# Patient Record
Sex: FEMALE | Race: WHITE | Employment: OTHER | URBAN - METROPOLITAN AREA
[De-identification: names, ages, dates, MRNs, and addresses within clinical notes are randomized per-mention and may not be internally consistent; named-entity substitution may affect disease eponyms.]

---

## 2019-10-02 ENCOUNTER — MEDICAL CORRESPONDENCE (OUTPATIENT)
Dept: HEALTH INFORMATION MANAGEMENT | Facility: CLINIC | Age: 64
End: 2019-10-02

## 2019-10-02 ENCOUNTER — TRANSFERRED RECORDS (OUTPATIENT)
Dept: HEALTH INFORMATION MANAGEMENT | Facility: CLINIC | Age: 64
End: 2019-10-02

## 2019-10-21 ENCOUNTER — HOSPITAL ENCOUNTER (OUTPATIENT)
Dept: CT IMAGING | Facility: CLINIC | Age: 64
Discharge: HOME | End: 2019-10-21
Attending: OPHTHALMOLOGY | Admitting: OPHTHALMOLOGY
Payer: COMMERCIAL

## 2019-10-21 ENCOUNTER — OFFICE VISIT (OUTPATIENT)
Dept: OPHTHALMOLOGY | Facility: CLINIC | Age: 64
End: 2019-10-21
Attending: OPHTHALMOLOGY
Payer: COMMERCIAL

## 2019-10-21 DIAGNOSIS — H57.89 THYROID EYE DISEASE: ICD-10-CM

## 2019-10-21 DIAGNOSIS — H50.51 ESOPHORIA: ICD-10-CM

## 2019-10-21 DIAGNOSIS — H57.89 THYROID EYE DISEASE: Primary | ICD-10-CM

## 2019-10-21 DIAGNOSIS — E07.9 THYROID EYE DISEASE: ICD-10-CM

## 2019-10-21 DIAGNOSIS — E07.9 THYROID EYE DISEASE: Primary | ICD-10-CM

## 2019-10-21 PROCEDURE — 84445 ASSAY OF TSI GLOBULIN: CPT | Performed by: OPHTHALMOLOGY

## 2019-10-21 PROCEDURE — 70480 CT ORBIT/EAR/FOSSA W/O DYE: CPT

## 2019-10-21 PROCEDURE — 92060 SENSORIMOTOR EXAMINATION: CPT | Mod: ZF | Performed by: OPHTHALMOLOGY

## 2019-10-21 PROCEDURE — 92285 EXTERNAL OCULAR PHOTOGRAPHY: CPT | Mod: ZF | Performed by: OPHTHALMOLOGY

## 2019-10-21 PROCEDURE — 36415 COLL VENOUS BLD VENIPUNCTURE: CPT | Performed by: OPHTHALMOLOGY

## 2019-10-21 PROCEDURE — G0463 HOSPITAL OUTPT CLINIC VISIT: HCPCS | Mod: 25,ZF

## 2019-10-21 RX ORDER — CYCLOBENZAPRINE HCL 10 MG
TABLET ORAL
Refills: 1 | COMMUNITY
Start: 2019-09-16

## 2019-10-21 RX ORDER — AMLODIPINE BESYLATE 5 MG/1
5 TABLET ORAL
COMMUNITY
Start: 2019-09-20

## 2019-10-21 RX ORDER — DEXTRAN 70, AND HYPROMELLOSE 2910 1; 3 MG/ML; MG/ML
SOLUTION/ DROPS OPHTHALMIC
Refills: 1 | COMMUNITY
Start: 2019-07-16

## 2019-10-21 RX ORDER — CETIRIZINE HYDROCHLORIDE 10 MG/1
10 TABLET ORAL
COMMUNITY
Start: 2019-09-20

## 2019-10-21 RX ORDER — ROPINIROLE 2 MG/1
1 TABLET, FILM COATED ORAL
Refills: 1 | COMMUNITY
Start: 2019-07-08

## 2019-10-21 RX ORDER — GABAPENTIN 100 MG/1
CAPSULE ORAL
Refills: 3 | COMMUNITY
Start: 2019-09-06

## 2019-10-21 RX ORDER — PROPRANOLOL HYDROCHLORIDE 120 MG/1
120 CAPSULE, EXTENDED RELEASE ORAL
COMMUNITY
Start: 2019-09-20

## 2019-10-21 RX ORDER — IBUPROFEN 800 MG
TABLET ORAL
COMMUNITY
Start: 2009-09-09

## 2019-10-21 ASSESSMENT — TONOMETRY
OD_IOP_MMHG: 22
IOP_METHOD: ICARE
OS_IOP_MMHG: 23

## 2019-10-21 ASSESSMENT — CONF VISUAL FIELD
METHOD: COUNTING FINGERS
OD_NORMAL: 1
OS_NORMAL: 1

## 2019-10-21 ASSESSMENT — MARGIN REFLEX DISTANCE
OD_MRD1: 7
OS_MRD1: 7

## 2019-10-21 ASSESSMENT — VISUAL ACUITY
METHOD: SNELLEN - LINEAR
OS_SC: 20/20
OD_SC: 20/25

## 2019-10-21 ASSESSMENT — CUP TO DISC RATIO
OD_RATIO: 0.1
OS_RATIO: 0.1

## 2019-10-21 NOTE — NURSING NOTE
Chief Complaint(s) and History of Present Illness(es)     Thyroid Disease     Laterality: both eyes    Onset: gradual    Associated symptoms: dryness and double vision.  Negative for eye pain and photophobia    Treatments tried: eye drops              Comments     Proptosis noted in mid 2018 with under lid swelling. S/P CE/IOL OU 2013 c YAG OU 2018 Dr. Appiah at [facility]. Noted gritty, dry eye feeling, now improved. Haloes noted at night. Diplopia noted intermittently when tired and closes one eye. [facility] seems stable d/n since cataract surgery.   MRI 7/2018 at [facility]   TSH done 7/18 and 10/18   H/O Type 2 DM, controlled with diet and now normal   Former smoker, quit over 40 yrs ago

## 2019-10-21 NOTE — PROGRESS NOTES
Assessment & Plan     HPI: Patient is a 64 year old female sent for consultation of thyroid eye disease by Dr. Appiah of [facility]. She was diagnosed with hyperthyroidism in May 2019. She notes that she first noted proptosis in mid 2018. She felt foreign body sensation for 6-8 months after first noticing proptosis. She notes that she has been having intermittent diagonal binocular diplopia for the past 2 months that is now more frequent and when she is tired. She notes occasional eye irritation, redness, pain worse at the end of the day. Former smoker and quit about 40 years ago. Denies ptosis, headache, insomnia, sweating, palpitations.     POH: Cataract surgery both eyes 2013, YAG capsulotomy both eyes    Referring physician: Dr. Zully Appiah    Thyroid history:  Diagnosed when? May 31st, 2019  GARCIA: NA  Thyroidectomy: NA  Thyroid medications: NA    PMH: Type 2 diabetes, negative for cancer  FH: Maternal aunt s/p GARCIA, paternal grandmother with hyperthyroidism    TSI (date): NA      Previous results: NA    Eye symptoms (since when):   Proptosis (better/worse/same since last visit): Initial    Diplopia(better/worse/same since last visit): Initial   Eyelid retraction(better/worse/same since last visit): Initial   Tearing(better/worse/same since last visit): Initial   Redness (better/worse/same since last visit): Initial   Pain ((better/worse/same since last visit): Initial   Pain to move the eyes (better/worse/same since last visit):Initial   Blurred vision: Initial     Ocular history:   Orbital decompression (date, details): NA  Strabismus surgery (date, details): NA  Eyelid surgery (date, details): NA    Exam:   Tamar (base):98     Better/worse same: 26/27  Strabismus (better/worse/same): Initial. 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  Eyelid retraction (better/worse/same): Initial      Mary Elliott is a 64 year old female with the following diagnoses:   1. Thyroid eye disease    2.  Pasha       Patient is a 64 year old diagnosed with hyperthyroidism in May 2019 subsequently found to have decreased uptake of radiotracer on thyroid uptake imaging. Her most recent T3 and T4 were normal without treatment for hyperthyroidism. She is being followed by endocrinology. 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 Recommend start selenium and obtaining TSI testing.  Recommend obtaining CT. Return to clinic in 4 months sooner as needed for worsening symptoms.            Attending Physician Attestation:  Complete documentation of historical and exam elements from today's encounter can be found in the full encounter summary report (not reduplicated in this progress note).  I personally obtained the chief complaint(s) and history of present illness.  I confirmed and edited as necessary the review of systems, past medical/surgical history, family history, social history, and examination findings as documented by others; and I examined the patient myself.  I personally reviewed the relevant tests, images, and reports as documented above.  I formulated and edited as necessary the assessment and plan and discussed the findings and management plan with the patient and family. I personally reviewed the ophthalmic test(s) associated with this encounter, agree with the interpretation(s) as documented by the resident/fellow, and have edited the corresponding report(s) as necessary.  - Kaiden Rayo MD  Ophthalmology, PGY-5  Neuro-Ophthalmology Fellow

## 2019-10-21 NOTE — RESULT ENCOUNTER NOTE
Mary,    Your CT results are enclosed.     Thank you for allowing me to share in your care.     Kaiden Amanda MD

## 2019-10-21 NOTE — PATIENT INSTRUCTIONS
Thyroid Eye Disease  An Overview  Dangelo Levine M.D.  Kaiden Amanda M.D.  Rene Byers MD      INTRODUCTION  Thyroid eye disease, thyroid orbitopathy and dysthyroid ophthalmopathy are all names which describe a disorder resulting from inflammation of the muscles that move the eyes and the fat within the bony orbit that surrounds the eyes.  Thyroid eye disease may cause the eyes to bulge forward and the lids to become swollen, red and retracted. This disease occurs four times more in females and although it may occur at any age, it is most common in middle aged individuals. It is important to note that 10% of all women will have a thyroid abnormality by the age of 55, and 20% of these individuals will have clinically significant eye changes.     ASSOCIATION WITH THYROID ABNORMALITIES  Thyroid eye disease is typically associated with disorders of the thyroid gland which sits in front of the lower throat. This gland produces thyroxine which is a hormone which affects appetite, metabolism, heart rate and body temperature.  Symptoms associated with high levels of thyroxine, or hyperthyroidism, include weight loss, nervousness, tremors, hair loss, menstrual irregularities and rapid pulse. Low levels, or hypothyroidism, may cause weight gain, depression, fatigue, and cold intolerance. Up to 10% of patients with thyroid abnormalities may develop thyroid eye disease. Sixty percent develop the eye disease within a year of the thyroid problems. Some patients with thyroid eye disease never have any thyroid problem at all. Although thyroid eye disease is associated with thyroid conditions, the two diseases progress and respond to treatment independently.     CAUSE OF THE DISEASE  The cause of thyroid eye disease is still unknown. It is likely due to an immune system attack on the muscle, fat and tear gland surrounding the eye. Other immune disorders include rheumatoid arthritis, lupus, diabetes and myasthenia  gravis. Patients with one immune disorder are often at risk for having others.     SYMPTOMS AND SIGNS     1. EYE PROTRUSION (PROPTOSIS OR EXOPHTHALMOS)      Swelling of the tissues behind the eyes may cause the eyes to protrude.  There are 6 muscles that move the eye. Four of these, the inferior rectus, medial rectus, lateral rectus and superior rectus are most frequently involved. These muscles originate behind the eye at the peak of the eye socket and attach to the eye just behind the cornea. The muscles cannot be seen on the surface of the eye but may become visible as the blood vessels over their anterior portion become prominent with the inflammatory reaction. The immune system singles out the fibroblasts, support cells within the muscles causing the muscles to enlarge.  With muscle enlargement, the eyeball is pushed forward. Frequently one eye is more bulged than the other, but generally both eyes become involved. As the muscles enlarge three things may happen: the eyeball protrudes, the muscles become stiff leading to abnormal movement and the muscles may press on the optic nerve (which transmits visual information from the eye to the brain).     2. EYELID RETRACTION   Inflammation and scarring of the muscles that open the eyelids may leave them abnormally open causing the characteristic  stare . This may also be exaggerated by the protrusion of the eyes in some cases.  In some patients, the eyelids may become so retracted that the eyes don t close completely, even during sleep.     3. DRY EYES AND EXCESSIVE TEARING   Bulging of the eye, eyelid retraction and inflammation of the tear gland may lead to exposure of the eye with evaporation of the protective tear film layer. This leads to dry eye syndrome with foreign body sensation ( sand in the eyes ) aching, burning and occasionally excessive reflex tearing due to the dryness. Severe cases may lead to breakdown in the surface of the cornea (the clear tissue  in front of the iris) with development of ulceration.     4. EYE AND EYELID REDNESS AND SWELLING   Poor venous drainage secondary to the swollen tissues behind the eyes may cause redness and swelling of the eyelids and conjunctiva (the clear membrane that lines the eyeball). In severe cases the swollen conjunctiva may look like a blister or  jelly  hanging over the eyelid. We strongly advise against the use of over-the-counter eyedrops that  get the red out.  These medications can actually make the problem worse and lead to severe problems.     5. GLAUCOMA   Some patients will develop elevated intraocular pressure (pressure inside the eye) which can lead to glaucoma. This is usually not painful, but if undetected and untreated may lead to vision loss.         6. DOUBLE VISION (DIPLOPIA)      Inflammation and scarring of the muscles that move the eyes may lead to poor movement. In mild cases, one might feel a pulling a sensation in the eyes. With more advanced disease, double vision may develop when looking in certain directions - usually up and out.  The inferior rectus muscle underneath the eye is the most frequently affected. When this muscle becomes stiff, the eye cannot look up normally, leading to double vision with one image above the other. In very severe cases the movement of the eyes can be very restricted with obvious misalignment of the eyes and constant double vision.     7. OPTIC NERVE COMPRESSION      Severe swelling of the muscles near the back of the orbit may press on the optic nerve (the cable carrying vision from the eye to the brain).  Early symptoms include dimming of vision and fading of colors.  Permanent visual loss may occur if this process is not recognized and promptly treated.  This severe complication occurs in only 5% of patients with thyroid eye disease and can be reversible if the pressure on the nerve is relieved.     COURSE OF THYROID EYE DISEASE   Thyroid eye disease typically has  an inflammatory phase which lasts 1 to 2 years (range 6 months to 5 years). After the inflammation has subsided, patients are left with a number of structural changes around the eyes that may require treatment.  Recurrences of the active phase occur in about 1% of patients. There is no perfect test to determine when a patient has passed from the  active  phase to  inactive  and instead we rely on your and our mak of observation. We assume that if the eyes have not changed for 6 months, then you have entered the  inactive  phase. We also obtain a  blood test  for thyroid stimulating immunoglobulin (TSIG) which gives some evidence of the immune system s reaction against the thyroid and orbital tissues.     It is important to recognize that every patient s course of thyroid eye disease is different and unique.  Some may have minimal signs while others have sudden onset of severe complications such as severe eyelid swelling, bulging of the eyes and vision loss.     We are still unable to determine which complications a particular patient will develop. Patients are therefore followed on a regular basis during the active phase of the disease.  Any new signs (redness, swelling, bulging) or symptoms (double vision, blurring, pain) should be reported immediately in case specific treatment in needed.  A telephone call to your doctor is never  a bother  but is a smart and responsible action.        SMOKING AND STRESS      A clear association between smoking and severity of thyroid eye disease has been demonstrated in many scientific studies, especially for women.  All patients who have thyroid eye disease and smoke should make a strong effort to stop smoking. Stress is also associated with exacerbations of thyroid eye disease.  We are happy to help refer you to the appropriate professionals, if you feel that would be beneficial.     DIAGNOSIS AND INVESTIGATIONS     Thyroid eye disease is diagnosed by the clinical features  described above.  It is confirmed using CT or MRI scans to show the enlarged muscles around the eyes.  Laboratory tests may be used to determine thyroid function (TSH) and inflammatory indicators such as TSIG (described above) may be used to follow the course of your disease.     Other tests may be ordered to document visual function and eye movements.  Photographs will be used to document the appearance of the eyes and  progression over time.     TREATMENT OF ACTIVE PHASE     1. THYROID GLAND TREATMENT   Your endocrinologist may prescribe various medications to suppress or increase your thyroid hormone level.  A radioactive iodine drink or thyroid removal surgery may be offered to destroy part of the gland.  Although these treatments are important for a patient s well-being, they do not appear to influence the course of thyroid eye disease in most patients. Some studies have suggested that radioactive iodine treatment may cause worsening of the eye disease. In our experience, this is rare and may be treated with oral steroid medications if necessary.      2. EYE MEDICATIONS   Mild exposure symptoms, dry eyes and excessive tearing can often be treated with lubricating eye drops (artificial tears) and ointments. Sometimes other medications such as topical steroids or Restasis may be prescribed to treat inflammation of the surface of the eyes. Punctal plugs may be placed into the small opening of your tear drain to keep the tears and medications around longer.     3. LIFESTYLE CHANGES   Swelling changes may be relieved by cutting the amount of salt in your diet and by elevating the head of your bed by placing bricks under the supports at the head of your bed.     4. ANTI-INFLAMMATORY MEDICATIONS   Moderate to severe inflammation of the eyelids and conjunctiva may be treated with corticosteroids (prednisone) or other immune-modulating medications.  Some people respond well to these medications but others do not.   If a person is going to respond, they usually do so rapidly within the first few days.  In cases of optic nerve compression, these medications may be used in combination with radiation and/or surgery.  Because these medications have serious side effects, they are not used for oxana periods of time (more than 6-8 weeks) or in cases of mild thyroid eye disease.     5. STEROID INJECTIONS   Due to the serious side effects of oral steroid medicines, we have been injecting small amounts of  these medicines around the eyes in patients with persistent inflammation. The injections are performed in the office and typically only take a few minutes to perform and the benefits last 6-8 weeks. Some patients require multiple injections until the disease burns out.     6. RADIATION THERAPY   Radiation therapy has been shown to reduce inflammation and is offered in cases of thyroid eye disease with moderate to severe soft tissue signs. The treatment is performed by a radiation specialist over a period of 1 to 2 weeks with each treatment lasting 30 to 60 minutes. The effects of the radiation may not be seen for 6-8 weeks after the last treatment. The exact mechanism of action of radiation of the tissues behind the eyes is still unknown, but we have seen excellent results in carefully selected cases. If you are diabetic or pregnant, you should not undergo radiation treatment.     7. ORBITAL DECOMPRESSION   Despite the effectiveness of oral medications, radiation treatment or both, there are some patients who continue to experience loss of vision due to swelling of the muscles which compress the optic nerve. In an effort to salvage the vision in these individuals, orbital decompression surgery is typically performed. In this procedure, bone is removed from the orbit to allow the orbital tissues to relax into the sinuses behind the eyes, taking the pressure off of the optic nerve.  This procedure may be performed by an oculoplastic  surgeon, occasionally with  the help of an ear, nose and throat specialist who specializes in sinus surgery.     TREATMENT IN THE INACTIVE PHASE     After your disease has passed into the inactive phase, there are a number of procedures that are available to help recapture some of your normal eye function and appearance.     1. ORBITAL DECOMPRESSION SURGERY      Orbital decompression surgery is performed to allow the eyes to recess back into the orbital space. The procedure typically involves removing one or more of the bony walls surrounding the eyes as well as some of the excess fat that has developed behind the eyes. The surgery is performed for relief of bulging and will also alleviate the pressure sensation that many patients experience. Surgery takes approximately one hour for each eye and is often staged so that each eye has time to recover. This surgery is typically performed by an ophthalmologist trained in orbital surgery.     2. MUSCLE ALIGNMENT (STRABISMUS) SURGERY      Following orbital decompression surgery, 10-20% of patients will develop new or worsening of their double vision. Strabismus surgery is performed to re-align the eyes to help with this. Often patients will regain single vision in straight ahead gaze but may continue to have double in side or up and down gazes. This surgery is performed under general anesthesia and takes 1-2 hours.  Often the muscle is put on a temporary suture that is permanently sutured in the correct position after the patient wakes up from the general anesthesia.  It is performed by an ophthalmologist trained in complex muscle surgery in kids and adults.     3. EYELID REPOSITIONING SURGERY      Many patients with thyroid eye disease have eyelid malpositions, primarily retraction of the upper and sometimes the lower eyelids. Strabismus surgery will often accentuate these problems. Upper eyelid recession is performed to lengthen and lower the upper eyelid. Often this can  be performed through the inside of the eyelid preventing an external incision and scar. If the eyelid retraction is severe, the procedure is performed through an incision in the natural eyelid crease.  Lower eyelids may be pulled downward in thyroid eye disease. Surgery is typically performed through a small incision made in the outer corner of the eyelids and an internal incision made on the inside of the eyelid. When the lower lid retraction is severe, a spacer graft may be placed on the inside of the eyelid to  splint  the eyelid upward. Surgery is usually performed on an outpatient basis under sedation and typically takes 20-30 minutes per lid and is performed by an ophthalmologist trained in plastic and reconstructive surgery around the eye.     4. COSMETIC EYELID SURGERY      Many patients develop prolapse of fat and excess skin following severe swelling that sometimes accompanies thyroid eye disease. Corrective surgery may be performed to remove some of this excess tissue. This surgery is usually performed on an outpatient basis under sedation and takes approximately one hour. It is performed by an ophthalmologist trained in plastic and reconstructive surgery around the eye     FREQUENTLY ASKED QUESTIONS   The doctors tell me they fixed my thyroid and that is now normal. Why are my eyes acting up?     In Graves  disease the thyroid gland is stimulated by the immune system to produce too much hormone. This excess hormone results in nervousness, palpitations, weight loss and tremors. Treatment is aimed at limiting the gland s ability to produce thyroid hormone.  This may be done with medications, radioactive iodine or surgery.  This does not, however, affect the primary auto-immune process and the immune system may continue to target other tissues including the tissues behind the eyes.  Orbital symptoms may even worsen following thyroid gland treatment.  As discussed, the eye and orbit must be treated  separately.     The steroids make my eyes much more comfortable. Can t I just continue using them?     Steroid therapy is very effective in halting the inflammatioin caused by thyroid eye disease and partially shrinking the muscle tissue. Steroid side effects are very common with continued treatment and can lead to high blood pressure, diabetes, weight gain and bone problems.  If there are continued problems with double vision, exposure of the eye or loss of vision then radiation treatment, steroid injections or surgery should be considered.     Why can t you fix my eyelids now?   Eye muscle surgery on the vertically acting muscles of the eye may cause a change in eyelid position.  Thus, we do not perform eyelid surgery until we have completed all other orbital and eye muscle surgery.

## 2019-10-21 NOTE — LETTER
10/21/2019         RE:  :  MRN: Mary Elliott  1955  [id number]     Dear Dr. Appiah,    Thank you for asking me to see your very pleasant patient, Mary Elliott, in neuro-ophthalmic consultation. I would like to thank you for sending your records and I have summarized them in the history of present illness. My assessment and plan are below. For further details, please see my attached clinic note.        Assessment & Plan   HPI: Patient is a 64 year old female sent for consultation of thyroid eye disease by Dr. Appiah of [facility]. She was diagnosed with hyperthyroidism in May 2019. She notes that she first noted proptosis in mid . She felt foreign body sensation for 6-8 months after first noticing proptosis. She notes that she has been having intermittent diagonal binocular diplopia for the past 2 months that is now more frequent and when she is tired. She notes occasional eye irritation, redness, pain worse at the end of the day. Former smoker and quit about 40 years ago. Denies ptosis, headache, insomnia, sweating, palpitations.     POH: Cataract surgery both eyes , YAG capsulotomy both eyes    Referring physician: Dr. Zully Appiah    Thyroid history:  Diagnosed when? May 31st, 2019  GARCIA: NA  Thyroidectomy: NA  Thyroid medications: NA    PMH: Type 2 diabetes, negative for cancer  FH: Maternal aunt s/p GARCIA, paternal grandmother with hyperthyroidism    TSI (date): NA      Previous results: NA    Eye symptoms (since when):   Proptosis (better/worse/same since last visit): Initial    Diplopia(better/worse/same since last visit): Initial   Eyelid retraction(better/worse/same since last visit): Initial   Tearing(better/worse/same since last visit): Initial   Redness (better/worse/same since last visit): Initial   Pain ((better/worse/same since last visit): Initial   Pain to move the eyes (better/worse/same since last visit):Initial   Blurred vision: Initial     Ocular history:   Orbital  decompression (date, details): NA  Strabismus surgery (date, details): NA  Eyelid surgery (date, details): NA    Exam:   Tamar (base):98     Better/worse same: 26/27  Strabismus (better/worse/same): Initial. 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  Eyelid retraction (better/worse/same): Initial    Mary Elliott is a 64 year old female with the following diagnoses:   1. Thyroid eye disease    2. Esophoria       Patient is a 64 year old diagnosed with hyperthyroidism in May 2019 subsequently found to have decreased uptake of radiotracer on thyroid uptake imaging. Her most recent T3 and T4 were normal without treatment for hyperthyroidism. She is being followed by endocrinology. 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 Recommend start selenium and obtaining TSI testing.  Recommend obtaining CT. Return to clinic in 4 months sooner as needed for worsening symptoms.        Again, thank you for allowing me to participate in the care of your patient.      Sincerely,    Kaiden Amanda MD  Professor  Ophthalmology Residency   Director of Neuro-Ophthalmology  [facility] Endowed Chair  Departments of Ophthalmology, Neurology, and Neurosurgery  [address]  [address]  T - [phone]  F - [phone]  [email or web address] [email or web address]      CC: Love Corral NP  [facility]  [address] [place] [address]  VIA Facsimile: [phone]     Zully Appiah OD  [facility]  [address] [place] [address]  VIA Facsimile: [phone]     Mary Elliott  [address] [place] [address]  VIA Mail       DX = Thyroid eye disease

## 2019-10-22 ENCOUNTER — TELEPHONE (OUTPATIENT)
Dept: OPHTHALMOLOGY | Facility: CLINIC | Age: 64
End: 2019-10-22

## 2019-10-22 NOTE — TELEPHONE ENCOUNTER
Spoke to patient about results of CT.  Will contact her once we get results of TSI lab back.  Assisted in scheduling follow-up appointment in April.      Rebeca Camp on 10/22/2019 at 1:01 PM

## 2019-10-22 NOTE — LETTER
CT ORBITAL WO CONTRAST 10/21/2019 1:58 PM     History: thyroid eye disease; Thyroid eye disease; Esophoria  ICD-10: Thyroid eye disease; Esophoria     Comparison: None       Technique: Using thin collimation multidetector helical acquisition  technique, axial, coronal, sagittal CT images were obtained through  the orbits and upper facial bones, without contrast     Findings:      Mild polypoid mucosal thickening of the left maxillary sinus,  remaining paranasal sinuses are clear. Mastoid air cells are clear.  The cribriform plate appears intact.     Bilateral medial rectus enlargement with left medial rectus muscle  measuring up to 5.2 mm, right medial rectus muscle measures up to 5.6  mm. There is sparing of the tendons. The other extraocular muscles are  normal in thickness. Bilateral proptosis. There is no stranding of the  retrobulbar fat. Lacrimal glands are normal.                                                                      Impression:   Bilateral proptosis and bilateral medial rectus enlargement, can be  seen with underlying suspicion for thyroid orbitopathy.     I have personally reviewed the examination and initial interpretation  and I agree with the findings.     EVI MOISE MD

## 2019-10-29 LAB — TSI SER-ACNC: 1.7 TSI INDEX

## 2020-03-15 ENCOUNTER — HEALTH MAINTENANCE LETTER (OUTPATIENT)
Age: 65
End: 2020-03-15

## 2020-06-26 ENCOUNTER — TELEPHONE (OUTPATIENT)
Dept: OPHTHALMOLOGY | Facility: CLINIC | Age: 65
End: 2020-06-26

## 2020-06-26 NOTE — TELEPHONE ENCOUNTER
Spoke with patient and confirmed the appointment for 6/29/2020. Also advised of clinic changes due to covid-19 (visitor restrictions, parking, etc.) Clinic phone number provided for questions.    Nisa Hassan

## 2020-06-29 ENCOUNTER — OFFICE VISIT (OUTPATIENT)
Dept: OPHTHALMOLOGY | Facility: CLINIC | Age: 65
End: 2020-06-29
Attending: OPHTHALMOLOGY
Payer: COMMERCIAL

## 2020-06-29 DIAGNOSIS — E07.9 THYROID EYE DISEASE: Primary | ICD-10-CM

## 2020-06-29 DIAGNOSIS — H57.89 THYROID EYE DISEASE: Primary | ICD-10-CM

## 2020-06-29 PROCEDURE — G0463 HOSPITAL OUTPT CLINIC VISIT: HCPCS | Mod: 25,ZF

## 2020-06-29 RX ORDER — HYDROCHLOROTHIAZIDE 25 MG/1
25 TABLET ORAL
COMMUNITY
Start: 2020-06-08

## 2020-06-29 RX ORDER — AMLODIPINE BESYLATE 2.5 MG/1
TABLET ORAL
COMMUNITY
Start: 2020-06-08

## 2020-06-29 RX ORDER — PROPRANOLOL HCL 60 MG
CAPSULE, EXTENDED RELEASE 24HR ORAL
COMMUNITY
Start: 2020-06-17

## 2020-06-29 RX ORDER — ROPINIROLE 2 MG/1
4 TABLET, FILM COATED ORAL
COMMUNITY
Start: 2018-09-26

## 2020-06-29 RX ORDER — GABAPENTIN 300 MG/1
300 CAPSULE ORAL
COMMUNITY
Start: 2020-03-24

## 2020-06-29 ASSESSMENT — CONF VISUAL FIELD
OS_NORMAL: 1
METHOD: COUNTING FINGERS
OD_NORMAL: 1

## 2020-06-29 ASSESSMENT — VISUAL ACUITY
OD_SC+: -2
OD_PH_SC: 20/25
OD_SC: 20/30
OS_SC: 20/25
METHOD: SNELLEN - LINEAR

## 2020-06-29 ASSESSMENT — MARGIN REFLEX DISTANCE
OD_MRD1: 5
OS_MRD1: 4

## 2020-06-29 ASSESSMENT — LAGOPHTHALMOS
OD_LAGOPHTHALMOS: 0
OS_LAGOPHTHALMOS: 0

## 2020-06-29 ASSESSMENT — LEVATOR FUNCTION
OD_LEVATOR: 15+
OS_LEVATOR: 15+

## 2020-06-29 ASSESSMENT — TONOMETRY
OS_IOP_MMHG: 22
IOP_METHOD: ICARE
OD_IOP_MMHG: 21

## 2020-06-29 ASSESSMENT — CUP TO DISC RATIO
OD_RATIO: 0.15
OS_RATIO: 0.15

## 2020-06-29 NOTE — NURSING NOTE
Chief Complaint(s) and History of Present Illness(es)     Thyroid Disease     Laterality: both eyes              Comments     Vision has seems to wosen since last visit. LE seems more proptotic. Feels like when she covers one eye, she doesn't see as well out of the other. Doesn't feel like she has diplopia. Doesn't use ATS regularly. Changing dosage of propanolol.

## 2020-06-29 NOTE — PROGRESS NOTES
"       Assessment & Plan     HPI: Patient is a 65 year old female sent for consultation of thyroid eye disease by Dr. Appiah of [facility]. She was diagnosed with hyperthyroidism in May 2019. Since last visit she reports she started taking selenium as part of a multivitamin. Recently, she thinks her vision is blurrier and notes diplopia in primary gaze, more notable in the evenings when she's more tired, and finds herself having to close one eye. She also notes that her left eye appears to be more \"bluging\" than last visit. Stable eye irritation and watering and redness. No pain.     Former smoker and quit about 40 years ago. Denies ptosis, headache, insomnia, sweating, palpitations.     She had a CT orbits 10/2019 notable for enlarged medial rectus each eye and proptosis of both globes. Has a checkup on thyroid goiter scheduled for 7/2020.     POH: Cataract surgery both eyes 2013, YAG capsulotomy both eyes    Referring physician: Dr. Zully Appiah    Thyroid history:  Diagnosed: May 31st, 2019  GARCIA: NA  Thyroidectomy: NA  Thyroid medications: NA    PMH: Previous DM-2 (now Hbg A1c 6.6), negative for cancer  FH: Maternal aunt s/p GARCIA, paternal grandmother with hyperthyroidism    TSI (date): 1.7 (10/2019)      Previous results: 2.4 (7/2019)    Eye symptoms (since when):   Proptosis (better/worse/same since last visit): same  Diplopia(better/worse/same since last visit): Same  Eyelid retraction(better/worse/same since last visit): same  Tearing(better/worse/same since last visit): better  Redness (better/worse/same since last visit): better  Pain ((better/worse/same since last visit): better   Pain to move the eyes (better/worse/same since last visit):same  Blurred vision: worse    Ocular history:   Orbital decompression (date, details): NA  Strabismus surgery (date, details): NA  Eyelid surgery (date, details): NA    Exam:   Tamar (base):98     Better/worse same: 25/26 (same from 2019)  Strabismus " (better/worse/same): same; 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  Eyelid retraction (better/worse/same): same      Mary Elliott is a 64 year old female with the following diagnoses:   1. Thyroid eye disease       Patient is a 65 year old diagnosed with hyperthyroidism in May 2019 subsequently found to have decreased uptake of radiotracer on thyroid uptake imaging. Her most recent TSI is improving to 1.7 from 2.4. She is being followed by endocrinology. 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     Overall, she looks like she is inactive.  Her thyroid hormones are not stable yet, but we can consider orbital decompression once the thyroid is under control.   Return to clinic in 4 months sooner as needed for worsening symptoms.            Attending Physician Attestation:  Complete documentation of historical and exam elements from today's encounter can be found in the full encounter summary report (not reduplicated in this progress note).  I personally obtained the chief complaint(s) and history of present illness.  I confirmed and edited as necessary the review of systems, past medical/surgical history, family history, social history, and examination findings as documented by others; and I examined the patient myself.  I personally reviewed the relevant tests, images, and reports as documented above.  I formulated and edited as necessary the assessment and plan and discussed the findings and management plan with the patient and family. - Kaiden Padilla MD  Ophthalmology Resident  PGY-3

## 2020-10-16 ENCOUNTER — TELEPHONE (OUTPATIENT)
Dept: OPHTHALMOLOGY | Facility: CLINIC | Age: 65
End: 2020-10-16

## 2020-10-19 ENCOUNTER — OFFICE VISIT (OUTPATIENT)
Dept: OPHTHALMOLOGY | Facility: CLINIC | Age: 65
End: 2020-10-19
Attending: OPHTHALMOLOGY
Payer: COMMERCIAL

## 2020-10-19 DIAGNOSIS — H50.51 ESOPHORIA: ICD-10-CM

## 2020-10-19 DIAGNOSIS — E07.9 THYROID EYE DISEASE: Primary | ICD-10-CM

## 2020-10-19 DIAGNOSIS — H57.89 THYROID EYE DISEASE: Primary | ICD-10-CM

## 2020-10-19 PROCEDURE — 92012 INTRM OPH EXAM EST PATIENT: CPT | Mod: GC | Performed by: OPHTHALMOLOGY

## 2020-10-19 PROCEDURE — G0463 HOSPITAL OUTPT CLINIC VISIT: HCPCS

## 2020-10-19 ASSESSMENT — TONOMETRY
IOP_METHOD: ICARE
OS_IOP_MMHG: 21
OD_IOP_MMHG: 21

## 2020-10-19 ASSESSMENT — MARGIN REFLEX DISTANCE
OD_MRD1: 5
OS_MRD1: 4

## 2020-10-19 ASSESSMENT — LAGOPHTHALMOS
OD_LAGOPHTHALMOS: 0
OS_LAGOPHTHALMOS: 0

## 2020-10-19 ASSESSMENT — VISUAL ACUITY
METHOD: SNELLEN - LINEAR
OD_SC: 20/20
OS_SC: 20/20

## 2020-10-19 NOTE — PROGRESS NOTES
"       Assessment & Plan     HPI: Patient is a 65 year old female with a history of hyperthyroidism (diagnosed May 2019).     Since 10/2019, she reports she started taking selenium as part of a multivitamin. She reports that her blurred vision and diplopia are stable compared to her last visit; she reports improvement in these symptoms with closing one eye. She reports that her eyes are \"bulging,\" worse on the left side, and stable since her last appointment. Stable eye irritation and watering and redness. No pain.     Per last telephone visit with endocrinology in 07/2020, patient's thyroid function studies are normal at this point, does not need any medication at this time. She states that she was cleared from an endocrinology standpoint for possible eye surgery. She states that she has not been taking propranolol since approximately 3 months ago.    Former smoker and quit about 40 years ago. Denies ptosis, headache, insomnia, sweating, palpitations.     She had a CT orbits 10/2019 notable for enlarged medial rectus each eye and proptosis of both globes.      POH: Cataract surgery both eyes 2013, YAG capsulotomy both eyes    Referring physician: Dr. Zully Appiah    Thyroid history:  Diagnosed: May 31st, 2019  GARCIA: NA  Thyroidectomy: NA  Thyroid medications: NA    PMH: Previous DM-2 (now Hbg A1c 6.6, 05/2020), negative for cancer  FH: Maternal aunt s/p GARCIA, paternal grandmother with hyperthyroidism    TSH: 0.51 (07/2020)  T4, free: 0.93 (07/2020)  T3, total: 88 (07/2020)  TSI (date): 1.7 (10/2019)      Previous results: 2.4 (7/2019)    Eye symptoms (since when):   Proptosis (better/worse/same since last visit): same  Diplopia(better/worse/same since last visit): same  Eyelid retraction(better/worse/same since last visit): same  Tearing(better/worse/same since last visit): same  Redness (better/worse/same since last visit): same  Pain (better/worse/same since last visit): better  Pain to move the eyes " (better/worse/same since last visit): better  Blurred vision: same    CHARLIE Score   1. Spontaneous orbital pain.     0  2. Gaze evoked orbital pain.     0  3. Eyelid swelling due to active thyroid eye disease  1  4. Eyelid erythema.       0  5. Conjunctival redness due to active thyroid eye disease . 1  6. Chemosis.        0  7. Inflammation of caruncle OR plica.    0    Patients assessed after follow-up can be scored out of 10 by  including items 8-10.    8. Increase of > 2mm in proptosis.     0  9. Decrease in uniocular excursion in any direction of > 8 . 0  10. Decrease of acuity equivalent to 1 Snellen line.  0    CHARLIE SCORE = 2/10      Ocular history:   Orbital decompression (date, details): NA  Strabismus surgery (date, details): NA  Eyelid surgery (date, details): NA    Exam:   Andres (base):98     Better/worse same: 27/29 worse   Strabismus (better/worse/same): better  Eyelid retraction (better/worse/same): same      Mary Elliott is a 64 year old female with the following diagnoses:   1. Thyroid eye disease    2. Esophoria       Patient is a 65 year old diagnosed with hyperthyroidism in May 2019, subsequently found to have decreased uptake of radiotracer on thyroid uptake imaging. Her most recent TSI improved to 1.7 from 2.4.     She is being followed by endocrinology. It is my impression that she most likely has active thyroid eye disease; CHARLIE score 3/10.     Per endocrinology, thyroid function testing has been stable and within normal limits. Patient is interested in discussing orbital decompression surgery at this time but has increased her andres exophthalmometry by 2 millimeters both eyes.      Plan:  Observe and follow up 4 months      Michelle Rowley MD  Ophthalmology Resident, PGY-2  Pager: [phone]         Attending Physician Attestation:  Complete documentation of historical and exam elements from today's encounter can be found in the full encounter summary report (not reduplicated in this progress  note).  I personally obtained the chief complaint(s) and history of present illness.  I confirmed and edited as necessary the review of systems, past medical/surgical history, family history, social history, and examination findings as documented by others; and I examined the patient myself.  I personally reviewed the relevant tests, images, and reports as documented above.  I formulated and edited as necessary the assessment and plan and discussed the findings and management plan with the patient and family. I personally reviewed the ophthalmic test(s) associated with this encounter, agree with the interpretation(s) as documented by the resident/fellow, and have edited the corresponding report(s) as necessary.  - Kaiden Amanda MD

## 2021-01-15 ENCOUNTER — HEALTH MAINTENANCE LETTER (OUTPATIENT)
Age: 66
End: 2021-01-15

## 2021-03-26 ENCOUNTER — TELEPHONE (OUTPATIENT)
Dept: OPHTHALMOLOGY | Facility: CLINIC | Age: 66
End: 2021-03-26

## 2021-03-29 ENCOUNTER — OFFICE VISIT (OUTPATIENT)
Dept: OPHTHALMOLOGY | Facility: CLINIC | Age: 66
End: 2021-03-29
Attending: OPHTHALMOLOGY
Payer: COMMERCIAL

## 2021-03-29 DIAGNOSIS — H05.243 CONSTANT EXOPHTHALMOS OF BOTH EYES: ICD-10-CM

## 2021-03-29 DIAGNOSIS — E07.9 THYROID EYE DISEASE: Primary | ICD-10-CM

## 2021-03-29 DIAGNOSIS — H57.89 THYROID EYE DISEASE: Primary | ICD-10-CM

## 2021-03-29 PROCEDURE — G0463 HOSPITAL OUTPT CLINIC VISIT: HCPCS | Mod: 25

## 2021-03-29 PROCEDURE — 92285 EXTERNAL OCULAR PHOTOGRAPHY: CPT | Performed by: OPHTHALMOLOGY

## 2021-03-29 PROCEDURE — 99213 OFFICE O/P EST LOW 20 MIN: CPT | Mod: GC | Performed by: OPHTHALMOLOGY

## 2021-03-29 ASSESSMENT — VISUAL ACUITY
OS_SC+: -2
OD_SC+: -1
OD_SC: 20/20
OS_SC: 20/20
METHOD: SNELLEN - LINEAR

## 2021-03-29 ASSESSMENT — MARGIN REFLEX DISTANCE
OD_MRD2: 6
OD_MRD1: 5
OS_MRD1: 2-3
OS_MRD2: 6

## 2021-03-29 ASSESSMENT — TONOMETRY
OS_IOP_MMHG: 22
OD_IOP_MMHG: 19
IOP_METHOD: ICARE T/B
OS_IOP_MMHG: 21
OD_IOP_MMHG: 18

## 2021-03-29 ASSESSMENT — CONF VISUAL FIELD
METHOD: COUNTING FINGERS
OD_NORMAL: 1
OS_NORMAL: 1

## 2021-03-29 NOTE — PROGRESS NOTES
"       Assessment & Plan     HPI: Patient is a 65 year old female with a history of hyperthyroidism (diagnosed May 2019).     Since 10/2019, she reports she started taking selenium as part of a multivitamin. She reports that her blurred vision and diplopia are stable compared to her last visit; she reports improvement in these symptoms with closing one eye. She reports that her eyes are \"bulging,\" worse on the left side, and stable since her last appointment. Stable eye irritation and watering and redness. No pain.     Per last telephone visit with endocrinology in 07/2020, patient's thyroid function studies are normal at this point, does not need any medication at this time. She states that she was cleared from an endocrinology standpoint for possible eye surgery. She states that she has not been taking propranolol since approximately 3 months ago.    Former smoker and quit about 40 years ago. Denies ptosis, headache, insomnia, sweating, palpitations.     She had a CT orbits 10/2019 notable for enlarged medial rectus each eye and proptosis of both globes.      POH: Cataract surgery both eyes 2013, YAG capsulotomy both eyes    Referring physician: Dr. Zully Appiah    INTERVAL UPDATE 03/29/21  Patient was last seen in 10/2020 at which time she had findings consistent with active thyroid eye disease CHARLIE score 3/10, and increase in exophthalmometry of 2mm. Therefore, we planned to observe her for 4 months and see her back today. Subjective, she states that her proptosis is improved, right > left. She believes this is because she had a sinus infection recently and had that treated. She has not seen Dr. Forbes for many months. She had follow up originally scheduled shortly after her most recent thyroid function labs done in February but had to cancel that appointment.     Labs from 2/11/21  TSH: 0.02  FT4: 1.27      Thyroid history:  Diagnosed: May 31st, 2019  GARCIA: NA  Thyroidectomy: NA  Thyroid medications: NA    PMH: " Previous DM-2 (now Hbg A1c 6.6, 05/2020), negative for cancer  FH: Maternal aunt s/p GARCIA, paternal grandmother with hyperthyroidism  - Glaucoma (sister, father)    TSH: 0.51 (07/2020)  T4, free: 0.93 (07/2020)  T3, total: 88 (07/2020)  TSI (date): 1.7 (10/2019)      Previous results: 2.4 (7/2019)    Eye symptoms (since when):   Proptosis (better/worse/same since last visit): better  Diplopia(better/worse/same since last visit): same  Eyelid retraction(better/worse/same since last visit): same  Tearing(better/worse/same since last visit): same  Redness (better/worse/same since last visit): same  Pain (better/worse/same since last visit): same  Pain to move the eyes (better/worse/same since last visit): same  Blurred vision: same    CHARLIE Score   1. Spontaneous orbital pain.     1  2. Gaze evoked orbital pain.     0  3. Eyelid swelling due to active thyroid eye disease  1  4. Eyelid erythema.       0  5. Conjunctival redness due to active thyroid eye disease . 1  6. Chemosis.        0  7. Inflammation of caruncle OR plica.    1    Patients assessed after follow-up can be scored out of 10 by  including items 8-10.    8. Increase of > 2mm in proptosis.     0  9. Decrease in uniocular excursion in any direction of > 8 . 0  10. Decrease of acuity equivalent to 1 Snellen line.  0    CHARLIE SCORE = 4/10      Ocular history:   Orbital decompression (date, details): NA  Strabismus surgery (date, details): NA  Eyelid surgery (date, details): NA    Exam:   Tamar (base): 27/28 (98)     Better/worse same: same/better (27/29 10/2020)  Strabismus (better/worse/same): better  Eyelid retraction (better/worse/same): same      Mary Elliott is a 64 year old female with the following diagnoses:   1. Thyroid eye disease    2. Constant exophthalmos of both eyes       Patient is a 65 year old diagnosed with hyperthyroidism in May 2019, subsequently found to have decreased uptake of radiotracer on thyroid uptake imaging. Her most recent TSI  improved to 1.7 from 2.4.     She is being followed by endocrinology, Dr. Niki Forbes. It is my impression that she most likely has active thyroid eye disease; CHARLIE score 4/10.     Per endocrinology, thyroid function testing has been stable and within normal limits. Patient is interested in discussing orbital decompression surgery at this time.  Her andres exophthalmometry is stable, however she has a CHARLIE of 4 still.      Plan:  Observe and follow up 4 months  Discuss with Dr. Dangelo Correa MD  Ophthalmology PGY-3  [facility]          Attending Physician Attestation:  Complete documentation of historical and exam elements from today's encounter can be found in the full encounter summary report (not reduplicated in this progress note).  I personally obtained the chief complaint(s) and history of present illness.  I confirmed and edited as necessary the review of systems, past medical/surgical history, family history, social history, and examination findings as documented by others; and I examined the patient myself.  I personally reviewed the relevant tests, images, and reports as documented above.  I formulated and edited as necessary the assessment and plan and discussed the findings and management plan with the patient and family. I personally reviewed the ophthalmic test(s) associated with this encounter, agree with the interpretation(s) as documented by the resident/fellow, and have edited the corresponding report(s) as necessary.  - Kaiden Amanda MD

## 2021-03-29 NOTE — NURSING NOTE
Chief Complaint(s) and History of Present Illness(es)     Thyroid Disease     Laterality: both eyes    Associated symptoms: double vision, tearing and redness              Comments     No changes in proptosis since last exam, intermittent diplopia at the end of the day when they are tired.  Pt has watering throughout the day and dryness that they do not use ATs for.  Pt is using baby wipes at the end of the day for mucus that accumulates on their eye lashes. Pt has been taking oral antibiotics and topical antibiotics for staph infection in their nasal passage at this time.

## 2021-05-09 ENCOUNTER — HEALTH MAINTENANCE LETTER (OUTPATIENT)
Age: 66
End: 2021-05-09

## 2021-06-18 ENCOUNTER — TELEPHONE (OUTPATIENT)
Dept: OPHTHALMOLOGY | Facility: CLINIC | Age: 66
End: 2021-06-18

## 2021-10-24 ENCOUNTER — HEALTH MAINTENANCE LETTER (OUTPATIENT)
Age: 66
End: 2021-10-24

## 2022-04-10 ENCOUNTER — HEALTH MAINTENANCE LETTER (OUTPATIENT)
Age: 67
End: 2022-04-10

## 2022-06-05 ENCOUNTER — HEALTH MAINTENANCE LETTER (OUTPATIENT)
Age: 67
End: 2022-06-05

## 2022-10-15 ENCOUNTER — HEALTH MAINTENANCE LETTER (OUTPATIENT)
Age: 67
End: 2022-10-15

## 2023-06-11 ENCOUNTER — HEALTH MAINTENANCE LETTER (OUTPATIENT)
Age: 68
End: 2023-06-11

## 2024-05-26 ENCOUNTER — HEALTH MAINTENANCE LETTER (OUTPATIENT)
Age: 69
End: 2024-05-26